# Patient Record
Sex: MALE | Race: WHITE | NOT HISPANIC OR LATINO | URBAN - METROPOLITAN AREA
[De-identification: names, ages, dates, MRNs, and addresses within clinical notes are randomized per-mention and may not be internally consistent; named-entity substitution may affect disease eponyms.]

---

## 2022-02-11 ENCOUNTER — CONSULTATION/EVALUATION (OUTPATIENT)
Dept: URBAN - METROPOLITAN AREA CLINIC 19 | Facility: CLINIC | Age: 67
End: 2022-02-11

## 2022-02-11 DIAGNOSIS — H16.223: ICD-10-CM

## 2022-02-11 DIAGNOSIS — H25.813: ICD-10-CM

## 2022-02-11 DIAGNOSIS — H35.363: ICD-10-CM

## 2022-02-11 DIAGNOSIS — H52.223: ICD-10-CM

## 2022-02-11 PROCEDURE — 92134 CPTRZ OPH DX IMG PST SGM RTA: CPT

## 2022-02-11 PROCEDURE — 92014 COMPRE OPH EXAM EST PT 1/>: CPT

## 2022-02-11 ASSESSMENT — TONOMETRY
OD_IOP_MMHG: 10
OS_IOP_MMHG: 10

## 2022-02-11 ASSESSMENT — KERATOMETRY
OS_AXISANGLE2_DEGREES: 154
OS_AXISANGLE_DEGREES: 064
OS_K2POWER_DIOPTERS: 46.50
OD_K2POWER_DIOPTERS: 46.50
OD_AXISANGLE2_DEGREES: 180
OS_K1POWER_DIOPTERS: 45.75
OD_K1POWER_DIOPTERS: 45.25
OD_AXISANGLE_DEGREES: 090

## 2022-02-11 ASSESSMENT — VISUAL ACUITY
OU_CC: 20/70
OU_SC: 20/20
OD_CC: 20/70
OD_SC: 20/50-2
OS_SC: 20/20
OS_CC: 20/200

## 2022-02-11 NOTE — PATIENT DISCUSSION
The patient expressed a desire to see through the full range of vision from distance, to middle, to near without glasses. The limitations of advanced lens technology were reviewed and the recommendation was made for the Synergy IOL OU. The patient understands there is no guarantee of glasses free vision and the more complete range of vision from the Synergy lens comes with a trade-off. Side effects include halos around point sources of light at night and failure to adapt in 1 in 500 cases resulting in the need for exchange of the lens.  Enhancement, including Lasik, may be necessary to achieve the full uncorrected vision potential. The patient elects Synergy OU, goal of emmetropia, right eye  to be done first.

## 2022-03-10 ENCOUNTER — PRE-OP/H&P (OUTPATIENT)
Dept: URBAN - METROPOLITAN AREA CLINIC 20 | Facility: CLINIC | Age: 67
End: 2022-03-10

## 2022-03-10 VITALS
HEIGHT: 65 IN | BODY MASS INDEX: 25.83 KG/M2 | SYSTOLIC BLOOD PRESSURE: 119 MMHG | DIASTOLIC BLOOD PRESSURE: 71 MMHG | WEIGHT: 155 LBS | HEART RATE: 73 BPM

## 2022-03-10 DIAGNOSIS — H25.813: ICD-10-CM

## 2022-03-10 PROCEDURE — 99211PRE PRE OP VISIT

## 2022-03-10 PROCEDURE — 92136 OPHTHALMIC BIOMETRY: CPT

## 2022-03-31 ENCOUNTER — POST-OP (OUTPATIENT)
Dept: URBAN - METROPOLITAN AREA CLINIC 19 | Facility: CLINIC | Age: 67
End: 2022-03-31

## 2022-03-31 DIAGNOSIS — Z96.1: ICD-10-CM

## 2022-03-31 PROCEDURE — 99024 POSTOP FOLLOW-UP VISIT: CPT

## 2022-03-31 ASSESSMENT — KERATOMETRY
OD_AXISANGLE_DEGREES: 090
OS_AXISANGLE2_DEGREES: 154
OD_K1POWER_DIOPTERS: 45.25
OD_AXISANGLE2_DEGREES: 180
OS_K2POWER_DIOPTERS: 46.50
OS_K1POWER_DIOPTERS: 45.75
OD_K2POWER_DIOPTERS: 46.50
OS_AXISANGLE_DEGREES: 064

## 2022-03-31 ASSESSMENT — VISUAL ACUITY: OD_SC: 20/20

## 2022-03-31 ASSESSMENT — TONOMETRY
OS_IOP_MMHG: 10
OD_IOP_MMHG: 11

## 2022-04-08 ENCOUNTER — POST-OP (OUTPATIENT)
Dept: URBAN - METROPOLITAN AREA CLINIC 19 | Facility: CLINIC | Age: 67
End: 2022-04-08

## 2022-04-08 DIAGNOSIS — H52.223: ICD-10-CM

## 2022-04-08 DIAGNOSIS — H16.223: ICD-10-CM

## 2022-04-08 DIAGNOSIS — Z96.1: ICD-10-CM

## 2022-04-08 DIAGNOSIS — H35.363: ICD-10-CM

## 2022-04-08 PROCEDURE — 99024 POSTOP FOLLOW-UP VISIT: CPT

## 2022-04-08 ASSESSMENT — KERATOMETRY
OS_K2POWER_DIOPTERS: 46.50
OS_AXISANGLE_DEGREES: 064
OS_K2POWER_DIOPTERS: 46.50
OD_K1POWER_DIOPTERS: 45.25
OS_AXISANGLE2_DEGREES: 154
OD_AXISANGLE_DEGREES: 090
OS_K1POWER_DIOPTERS: 45.75
OD_AXISANGLE_DEGREES: 090
OS_AXISANGLE_DEGREES: 064
OS_AXISANGLE2_DEGREES: 154
OD_AXISANGLE2_DEGREES: 180
OD_AXISANGLE2_DEGREES: 180
OD_K2POWER_DIOPTERS: 46.50
OD_K2POWER_DIOPTERS: 46.50
OD_K1POWER_DIOPTERS: 45.25
OS_K1POWER_DIOPTERS: 45.75

## 2022-04-08 ASSESSMENT — TONOMETRY
OS_IOP_MMHG: 13
OD_IOP_MMHG: 9

## 2022-04-08 ASSESSMENT — VISUAL ACUITY
OS_CC: 20/20-1
OD_SC: 20/25
OS_SC: 20/100+1

## 2022-05-03 ENCOUNTER — ESTABLISHED PATIENT (OUTPATIENT)
Dept: URBAN - METROPOLITAN AREA CLINIC 19 | Facility: CLINIC | Age: 67
End: 2022-05-03

## 2022-05-03 DIAGNOSIS — Z96.1: ICD-10-CM

## 2022-05-03 PROCEDURE — 99024 POSTOP FOLLOW-UP VISIT: CPT

## 2022-05-03 ASSESSMENT — TONOMETRY
OD_IOP_MMHG: 10
OS_IOP_MMHG: 11

## 2022-05-03 ASSESSMENT — KERATOMETRY
OD_AXISANGLE2_DEGREES: 180
OS_AXISANGLE2_DEGREES: 154
OS_AXISANGLE_DEGREES: 064
OS_K2POWER_DIOPTERS: 46.50
OS_K1POWER_DIOPTERS: 45.75
OD_AXISANGLE_DEGREES: 090
OD_K2POWER_DIOPTERS: 46.50
OD_K1POWER_DIOPTERS: 45.25

## 2022-05-03 ASSESSMENT — VISUAL ACUITY
OS_CC: 20/20
OU_SC: 20/25
OD_SC: 20/30

## 2022-07-11 ENCOUNTER — CONTACT LENSES/GLASSES VISIT (OUTPATIENT)
Dept: URBAN - METROPOLITAN AREA CLINIC 19 | Facility: CLINIC | Age: 67
End: 2022-07-11

## 2022-07-11 DIAGNOSIS — H16.223: ICD-10-CM

## 2022-07-11 DIAGNOSIS — H35.363: ICD-10-CM

## 2022-07-11 DIAGNOSIS — H26.491: ICD-10-CM

## 2022-07-11 DIAGNOSIS — H25.812: ICD-10-CM

## 2022-07-11 PROCEDURE — 99211NC NO CHARGE VISIT

## 2022-07-11 ASSESSMENT — VISUAL ACUITY
OU_CC: 20/20-3
OD_CC: 20/30-1
OS_CC: 20/25
OU_CC: 20/25

## 2022-07-11 ASSESSMENT — KERATOMETRY
OD_K1POWER_DIOPTERS: 45.50
OS_AXISANGLE_DEGREES: 064
OS_AXISANGLE2_DEGREES: 151
OD_AXISANGLE_DEGREES: 79
OS_AXISANGLE2_DEGREES: 154
OD_K2POWER_DIOPTERS: 46.50
OD_AXISANGLE2_DEGREES: 180
OD_AXISANGLE_DEGREES: 090
OS_K2POWER_DIOPTERS: 46.50
OS_K1POWER_DIOPTERS: 46.00
OS_K1POWER_DIOPTERS: 45.75
OS_AXISANGLE_DEGREES: 61
OD_K1POWER_DIOPTERS: 45.25
OD_AXISANGLE2_DEGREES: 169

## 2022-07-11 ASSESSMENT — TONOMETRY
OD_IOP_MMHG: 9
OS_IOP_MMHG: 8

## 2022-07-14 ENCOUNTER — CONSULTATION/EVALUATION (OUTPATIENT)
Dept: URBAN - METROPOLITAN AREA CLINIC 20 | Facility: CLINIC | Age: 67
End: 2022-07-14

## 2022-07-14 DIAGNOSIS — H26.491: ICD-10-CM

## 2022-07-14 PROCEDURE — 66821 AFTER CATARACT LASER SURGERY: CPT

## 2022-07-14 PROCEDURE — 99214 OFFICE O/P EST MOD 30 MIN: CPT

## 2022-07-14 ASSESSMENT — VISUAL ACUITY
OS_SC: 20/20
OD_SC: 20/40+1
OD_PH: 20/40

## 2022-07-14 ASSESSMENT — TONOMETRY
OD_IOP_MMHG: 10
OS_IOP_MMHG: 10

## 2022-07-14 NOTE — PROCEDURE NOTE: CLINICAL
PROCEDURE NOTE: YAG Capsulotomy OD. Diagnosis: Posterior Capsular Opacity. Anesthesia: Topical. Prior to commencing surgery patient identification, surgical procedure, site, and side were confirmed by Dr. Delma Bernal. Following topical proparacaine anesthesia, the patient was positioned at the YAG laser, a contact lens coupled to the cornea with methylcellulose and an axial posterior performed capsulotomy without complication . Excess methylcellulose was washed from the eye. One drop of Alphagan was instilled and the patient returned to the holding area having tolerated the procedure well and without complication. Humaira Yusuf

## 2022-07-22 ENCOUNTER — FOLLOW UP (OUTPATIENT)
Dept: URBAN - METROPOLITAN AREA CLINIC 19 | Facility: CLINIC | Age: 67
End: 2022-07-22

## 2022-07-22 DIAGNOSIS — Z98.890: ICD-10-CM

## 2022-07-22 DIAGNOSIS — H35.363: ICD-10-CM

## 2022-07-22 DIAGNOSIS — H16.223: ICD-10-CM

## 2022-07-22 PROCEDURE — 99024 POSTOP FOLLOW-UP VISIT: CPT

## 2022-07-22 ASSESSMENT — KERATOMETRY
OD_AXISANGLE2_DEGREES: 178
OS_K1POWER_DIOPTERS: 45.75
OD_K2POWER_DIOPTERS: 46.50
OS_AXISANGLE2_DEGREES: 150
OS_K2POWER_DIOPTERS: 46.50
OD_AXISANGLE_DEGREES: 88
OS_AXISANGLE_DEGREES: 60
OD_K1POWER_DIOPTERS: 45.25

## 2022-07-22 ASSESSMENT — TONOMETRY
OD_IOP_MMHG: 13
OS_IOP_MMHG: 13

## 2022-07-22 ASSESSMENT — VISUAL ACUITY
OD_CC: 20/20-1
OU_CC: J1
OS_CC: 20/25-1
OU_CC: 20/20

## 2023-01-13 ENCOUNTER — FOLLOW UP (OUTPATIENT)
Dept: URBAN - METROPOLITAN AREA CLINIC 19 | Facility: CLINIC | Age: 68
End: 2023-01-13

## 2023-01-13 DIAGNOSIS — H25.812: ICD-10-CM

## 2023-01-13 DIAGNOSIS — H52.31: ICD-10-CM

## 2023-01-13 PROCEDURE — 92014 COMPRE OPH EXAM EST PT 1/>: CPT

## 2023-01-13 ASSESSMENT — VISUAL ACUITY
OD_CC: 20/20
OU_CC: J1-1
OD_SC: 20/20
OU_CC: 20/20
OS_CC: 20/20

## 2023-01-13 ASSESSMENT — TONOMETRY
OD_IOP_MMHG: 9
OS_IOP_MMHG: 16

## 2023-01-13 NOTE — PATIENT DISCUSSION
There is anisometropia following cataract surgery. Since the difference in refraction would be difficult to tolerate in the form of an eyeglass, I recommended cataract surgery for the second eye, but at this time patient defers. Will refer to Dr. Lan Ortiz n/a [facility] for a CL in the left eye to help.

## 2023-01-30 ENCOUNTER — FOLLOW UP (OUTPATIENT)
Dept: URBAN - METROPOLITAN AREA CLINIC 19 | Facility: CLINIC | Age: 68
End: 2023-01-30

## 2023-01-30 DIAGNOSIS — H52.12: ICD-10-CM

## 2023-01-30 PROCEDURE — 92310B CONTACT LEN 60

## 2023-01-30 ASSESSMENT — KERATOMETRY
OS_AXISANGLE_DEGREES: 055
OS_K2POWER_DIOPTERS: 46.50
OD_K2POWER_DIOPTERS: 46.50
OD_K1POWER_DIOPTERS: 45.25
OS_AXISANGLE2_DEGREES: 145
OD_AXISANGLE_DEGREES: 081
OS_K1POWER_DIOPTERS: 46.00
OD_AXISANGLE2_DEGREES: 171

## 2023-01-30 ASSESSMENT — VISUAL ACUITY
OS_CC: 20/25
OD_CC: 20/25-1
OU_CC: 20/25

## 2023-01-30 NOTE — PATIENT DISCUSSION
There is anisometropia following cataract surgery. Since the difference in refraction would be difficult to tolerate in the form of an eyeglass, I recommended cataract surgery for the second eye, but at this time patient defers. Will refer to Dr. Ellen Garcia n/a [address] [facility] for a CL in the left eye to help.

## 2023-11-06 ENCOUNTER — COMPREHENSIVE EXAM (OUTPATIENT)
Dept: URBAN - METROPOLITAN AREA CLINIC 19 | Facility: CLINIC | Age: 68
End: 2023-11-06

## 2023-11-06 DIAGNOSIS — Z98.890: ICD-10-CM

## 2023-11-06 DIAGNOSIS — Z97.3: ICD-10-CM

## 2023-11-06 DIAGNOSIS — H35.363: ICD-10-CM

## 2023-11-06 DIAGNOSIS — H25.812: ICD-10-CM

## 2023-11-06 DIAGNOSIS — H43.813: ICD-10-CM

## 2023-11-06 DIAGNOSIS — H16.223: ICD-10-CM

## 2023-11-06 DIAGNOSIS — H52.12: ICD-10-CM

## 2023-11-06 PROCEDURE — 92310A CONTACT LENS 50

## 2023-11-06 PROCEDURE — 92014 COMPRE OPH EXAM EST PT 1/>: CPT

## 2023-11-06 PROCEDURE — 92015 DETERMINE REFRACTIVE STATE: CPT

## 2023-11-06 ASSESSMENT — KERATOMETRY
OS_AXISANGLE_DEGREES: 150
OS_K2POWER_DIOPTERS: 46.25
OD_K2POWER_DIOPTERS: 45.75
OS_K1POWER_DIOPTERS: 47.25
OS_AXISANGLE2_DEGREES: 60
OD_K1POWER_DIOPTERS: 47.00
OD_AXISANGLE2_DEGREES: 85
OD_AXISANGLE_DEGREES: 175

## 2023-11-06 ASSESSMENT — VISUAL ACUITY
OS_CC: 20/25-1
OD_CC: 20/25-1
OU_CC: 20/20

## 2023-11-06 ASSESSMENT — TONOMETRY
OD_IOP_MMHG: 10
OS_IOP_MMHG: 12